# Patient Record
Sex: FEMALE | Race: WHITE | NOT HISPANIC OR LATINO | Employment: FULL TIME | URBAN - METROPOLITAN AREA
[De-identification: names, ages, dates, MRNs, and addresses within clinical notes are randomized per-mention and may not be internally consistent; named-entity substitution may affect disease eponyms.]

---

## 2017-07-26 ENCOUNTER — GENERIC CONVERSION - ENCOUNTER (OUTPATIENT)
Dept: OTHER | Facility: OTHER | Age: 22
End: 2017-07-26

## 2017-07-28 ENCOUNTER — ALLSCRIPTS OFFICE VISIT (OUTPATIENT)
Dept: OTHER | Facility: OTHER | Age: 22
End: 2017-07-28

## 2017-07-28 LAB
BILIRUB UR QL STRIP: NORMAL
CLARITY UR: NORMAL
COLOR UR: YELLOW
GLUCOSE (HISTORICAL): NORMAL
HGB UR QL STRIP.AUTO: NORMAL
KETONES UR STRIP-MCNC: NORMAL MG/DL
LEUKOCYTE ESTERASE UR QL STRIP: NORMAL
NITRITE UR QL STRIP: NORMAL
PH UR STRIP.AUTO: 7.5 [PH]
PROT UR STRIP-MCNC: NORMAL MG/DL
SP GR UR STRIP.AUTO: 1
UROBILINOGEN UR QL STRIP.AUTO: 0.2

## 2018-01-13 VITALS
WEIGHT: 105 LBS | HEART RATE: 70 BPM | DIASTOLIC BLOOD PRESSURE: 70 MMHG | TEMPERATURE: 97.5 F | SYSTOLIC BLOOD PRESSURE: 114 MMHG | BODY MASS INDEX: 17.93 KG/M2 | HEIGHT: 64 IN

## 2018-01-17 NOTE — PROGRESS NOTES
Assessment    1  Sinusitis (473 9) (J32 9)   2  Seasonal allergies (477 9) (J30 2)    Plan  Health Maintenance    · Urine Dip Non-Automated- POC; Status:Complete;   Done: [id number] 04:12PM  Sinusitis    · Amoxicillin-Pot Clavulanate 875-125 MG Oral Tablet; TAKE 1 TABLET EVERY 12  HOURS WITH MEALS UNTIL GONE    Discussion/Summary  health maintenance visit healthy adult female Currently, she eats a healthy diet and has an adequate exercise regimen  next cervical cancer screening is due now The immunizations are needed and Due for TDAP, will wait until she is feeling better  She was advised to be evaluated by a dentist  Advice and education were given regarding nutrition, weight bearing exercise, calcium supplements, vitamin D supplements, reproductive health, sunscreen use, self skin examination and seat belt use  Patient discussion: discussed with the patient  Healthy young woman here for wellness exam and to establish care  Sinusitis: Recommend treatment with course of Augmentin as prescribed  May eat yogurt daily or take OTC probiotic to minimize side effect of diarrhea  Allergic Rhinitis: Continue Flonase 2 sprays each nostril daily  OTC Claritin or Zyrtec 10 mg daily  If symptoms do not improve by Monday, or if symptoms persist call the office  Chief Complaint  Pt is c/o congestion and cough for the past few weeks  She has tried [employer] and OTC therapies without relief  History of Present Illness  , Adult Female: The patient is being seen for a health maintenance evaluation  Social History: Household members include mother  She is unmarried  Work status: working part-time and [employer] for summer, 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  The patient has never smoked cigarettes  She reports occasional alcohol use  She has never used illicit drugs  General Health: The patient's health since the last visit is described as good  She does not have regular dental visits   The patient reports her last dental visit was 2 years ago  She complains of vision problems  Vision care includes wearing soft contact lenses  She denies hearing loss  Immunizations status: not up to date   Needs TDAP  Lifestyle:  She consumes a diverse and healthy diet  She exercises regularly  She exercises 3 or more times per week  Reproductive health: the patient is perimenopausal   she reports normal menses  Screening: Cervical cancer screening includes no previous pap smear, no previous human papilloma virus screening and no previous colposcopy  Safety elements used: seat belt, safe driving habits and sunscreen  HPI: 24year old female presents today for a wellness exam and to establish care  Has cold symptoms today  She moved here 1 year ago from [place] to attend [employer]  She is not living on campus  For the past 2-3 weeks she has nasal congestion, post nasal drip, and productive cough for green sputum  Has tried Mucinex, and Flonase with no relief  Nonsmoker  She does have seasonal allergies  Review of Systems    Constitutional: feeling tired, but no fever, not feeling poorly and no chills  Eyes: no eye pain, eyes not red and no purulent discharge from the eyes  ENT: sore throat, nasal discharge and hoarseness, but no earache  Cardiovascular: the heart rate was not slow, no chest pain, the heart rate was not fast and no palpitations  Respiratory: cough, but no shortness of breath, no wheezing and no shortness of breath during exertion  Gastrointestinal: no abdominal pain, no nausea, no vomiting and no diarrhea  Musculoskeletal: no myalgias  Integumentary: no rashes and no skin lesions  Neurological: no headache, no numbness, no tingling, no dizziness, no limb weakness, no convulsions, no fainting and no difficulty walking  Psychiatric: not suicidal, no anxiety, no personality change, no sleep disturbances, no depression and no emotional problems     Endocrine: no muscle weakness and no hot flashes  no feelings of weakness   Hematologic/Lymphatic: no swollen glands, no tendency for easy bleeding, no tendency for easy bruising and no swollen glands in the neck  Active Problems    1  Seasonal allergies (477 9) (J30 2)    Past Medical History    · History of Known health problems: none (V49 89) (Z78 9)    Family History  Mother    · Family history of Healthy adult  Father    · Family history of Healthy adult    Social History    · College student   · 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   · Never a smoker   · Occasional alcohol use    Current Meds   1  Flonase Allergy Relief 50 MCG/ACT Nasal Suspension; Therapy: [id number] to Recorded    Allergies    1  No Known Drug Allergies    Vitals   Recorded: [id number] 04:08PM   Temperature 97 5 F   Heart Rate 70   Systolic 554   Diastolic 70   Height 5 ft 3 5 in   Weight 105 lb    BMI Calculated 18 31   BSA Calculated 1 48     Physical Exam    Constitutional   General appearance: No acute distress, well appearing and well nourished  Head and Face   Head and face: Normal     Palpation of the face and sinuses: No sinus tenderness  Eyes   Conjunctiva and lids: No swelling, erythema or discharge  Pupils and irises: Equal, round, reactive to light  Ears, Nose, Mouth, and Throat   External inspection of ears and nose: Normal     Otoscopic examination: Tympanic membranes translucent with normal light reflex  Canals patent without erythema  Nasal mucosa, septum, and turbinates: Abnormal   The bilateral nasal mucosa was edematous, excoriated and red  Lips, teeth, and gums: Normal, good dentition  Oropharynx: Abnormal   The posterior pharynx Mild erythema with post nasal drip  Neck   Neck: Supple, symmetric, trachea midline, no masses  Thyroid: Normal, no thyromegaly  Pulmonary   Respiratory effort: No increased work of breathing or signs of respiratory distress  Auscultation of lungs: Clear to auscultation  Cardiovascular   Auscultation of heart: Normal rate and rhythm, normal S1 and S2, no murmurs  Pedal pulses: 2+ bilaterally  Examination of extremities for edema and/or varicosities: Normal     Abdomen   Abdomen: Non-tender, no masses  Liver and spleen: No hepatomegaly or splenomegaly  Lymphatic   Palpation of lymph nodes in neck: No lymphadenopathy  Musculoskeletal   Gait and station: Normal     Digits and nails: Normal without clubbing or cyanosis  Joints, bones, and muscles: Normal     Range of motion: Normal     Stability: Normal     Muscle strength/tone: Normal     Skin   Skin and subcutaneous tissue: Normal without rashes or lesions  Neurologic   Cranial nerves: Cranial nerves II-XII intact  Psychiatric   Judgment and insight: Normal     Orientation to person, place, and time: Normal     Recent and remote memory: Intact  Mood and affect: Normal        Results/Data  Urine Dip Non-Automated- POC [id number] 04:12PM Carline Edwards     Test Name Result Flag Reference   Color Yellow     Clarity Transparent     Leukocytes -     Nitrite -     Blood -     Bilirubin -     Urobilinogen 0 2     Protein -     Ph 7 5     Specific Gravity 1 005     Ketone -     Glucose -         Signatures   Electronically signed by : PHILL Campos; Jul 29 2017  6:40PM EST                       (Author)    Electronically signed by :  ALEXIS Vaughn ; Jul 29 2017  8:17PM EST                       (Author)

## 2020-01-27 ENCOUNTER — OFFICE VISIT (OUTPATIENT)
Dept: OBGYN CLINIC | Facility: CLINIC | Age: 25
End: 2020-01-27
Payer: COMMERCIAL

## 2020-01-27 VITALS — WEIGHT: 107 LBS | BODY MASS INDEX: 18.66 KG/M2 | SYSTOLIC BLOOD PRESSURE: 104 MMHG | DIASTOLIC BLOOD PRESSURE: 68 MMHG

## 2020-01-27 DIAGNOSIS — N63.24 BREAST LUMP ON LEFT SIDE AT 7 O'CLOCK POSITION: Primary | ICD-10-CM

## 2020-01-27 PROCEDURE — 99202 OFFICE O/P NEW SF 15 MIN: CPT | Performed by: NURSE PRACTITIONER

## 2020-01-27 NOTE — ASSESSMENT & PLAN NOTE
There is a 1cm x 1cm nontender, soft, mobile lump of the left breast palpable at 7 o'clock  The lump seems to be resolving on its own and is cyclical in nature  Provided patient with a slip for left breast ultrasound  Recommend that patient continue to monitor lump over the next week  If lump does not resolve, recommend patient call and schedule left breast ultrasound for further evaluation  Patient expresses understanding and agrees to plan

## 2020-01-27 NOTE — PROGRESS NOTES
Assessment/Plan:    Breast lump on left side at 7 o'clock position  There is a 1cm x 1cm nontender, soft, mobile lump of the left breast palpable at 7 o'clock  The lump seems to be resolving on its own and is cyclical in nature  Provided patient with a slip for left breast ultrasound  Recommend that patient continue to monitor lump over the next week  If lump does not resolve, recommend patient call and schedule left breast ultrasound for further evaluation  Patient expresses understanding and agrees to plan  Diagnoses and all orders for this visit:    Breast lump on left side at 7 o'clock position  -     US breast left limited (diagnostic); Future          Subjective:      Patient ID: Brennon Vann is a 25 y o  female  Roxie Noyola is a new patient here for a problem visit  Roxie Noyola c/o a left breast lump that she noticed last Thursday, 4 days ago  She felt the lump before she was getting in the shower and noticed a bruise there as well  Denies trauma to her breast, fever, chills, nipple discharge, pain  She started her menses on Thursday as well  Since Thursday, the lump has gotten smaller and it's harder for her to palpate now  She had a breast lump previously in high school and this resolved on its own  Her mom has a hx of fibrocystic breasts  The following portions of the patient's history were reviewed and updated as appropriate: allergies, current medications, past family history, past medical history, past social history, past surgical history and problem list     Review of Systems   Constitutional: Negative  HENT: Negative  Eyes: Negative  Respiratory: Negative  Cardiovascular: Negative  Gastrointestinal: Negative  Endocrine: Negative  Genitourinary: Negative  Left breast lump   Musculoskeletal: Negative  Skin: Negative  Allergic/Immunologic: Negative  Neurological: Negative  Hematological: Negative  Psychiatric/Behavioral: Negative  Objective:      /68   Wt 48 5 kg (107 lb)   LMP 01/23/2020   BMI 18 66 kg/m²          Physical Exam   Constitutional: She is oriented to person, place, and time  She appears well-developed and well-nourished  No distress  Cardiovascular: Normal rate, regular rhythm and normal heart sounds  Pulmonary/Chest: Effort normal and breath sounds normal  Right breast exhibits no inverted nipple, no mass, no nipple discharge, no skin change and no tenderness  Left breast exhibits mass  Left breast exhibits no inverted nipple, no nipple discharge, no skin change and no tenderness  No breast swelling, tenderness, discharge or bleeding  Breasts are symmetrical    There is a 1cm x 1cm soft, mobile, non tender lump of the left breast at 7 o 'clock  Musculoskeletal: Normal range of motion  Neurological: She is alert and oriented to person, place, and time  Skin: Skin is warm and dry  Psychiatric: She has a normal mood and affect  Her behavior is normal  Judgment and thought content normal    Vitals reviewed

## 2020-04-07 ENCOUNTER — TELEPHONE (OUTPATIENT)
Dept: FAMILY MEDICINE CLINIC | Facility: CLINIC | Age: 25
End: 2020-04-07

## 2020-07-31 ENCOUNTER — OFFICE VISIT (OUTPATIENT)
Dept: FAMILY MEDICINE CLINIC | Facility: CLINIC | Age: 25
End: 2020-07-31
Payer: COMMERCIAL

## 2020-07-31 VITALS
TEMPERATURE: 98.4 F | HEART RATE: 62 BPM | BODY MASS INDEX: 17.86 KG/M2 | SYSTOLIC BLOOD PRESSURE: 118 MMHG | HEIGHT: 64 IN | DIASTOLIC BLOOD PRESSURE: 70 MMHG | WEIGHT: 104.6 LBS | RESPIRATION RATE: 16 BRPM | OXYGEN SATURATION: 98 %

## 2020-07-31 DIAGNOSIS — Z13.220 LIPID SCREENING: ICD-10-CM

## 2020-07-31 DIAGNOSIS — R31.9 URINARY TRACT INFECTION WITH HEMATURIA, SITE UNSPECIFIED: Primary | ICD-10-CM

## 2020-07-31 DIAGNOSIS — R63.4 WEIGHT LOSS: ICD-10-CM

## 2020-07-31 DIAGNOSIS — Z76.89 ENCOUNTER TO ESTABLISH CARE: ICD-10-CM

## 2020-07-31 DIAGNOSIS — Z12.4 CERVICAL CANCER SCREENING: ICD-10-CM

## 2020-07-31 DIAGNOSIS — Z00.00 PHYSICAL EXAM: ICD-10-CM

## 2020-07-31 DIAGNOSIS — N39.0 URINARY TRACT INFECTION WITH HEMATURIA, SITE UNSPECIFIED: Primary | ICD-10-CM

## 2020-07-31 LAB
AMORPH PHOS CRY URNS QL MICRO: ABNORMAL /HPF
BACTERIA UR QL AUTO: ABNORMAL /HPF
BILIRUB UR QL STRIP: NEGATIVE
CLARITY UR: ABNORMAL
COLOR UR: YELLOW
GLUCOSE UR STRIP-MCNC: NEGATIVE MG/DL
HGB UR QL STRIP.AUTO: ABNORMAL
KETONES UR STRIP-MCNC: NEGATIVE MG/DL
LEUKOCYTE ESTERASE UR QL STRIP: NEGATIVE
MUCOUS THREADS UR QL AUTO: ABNORMAL
NITRITE UR QL STRIP: NEGATIVE
NON-SQ EPI CELLS URNS QL MICRO: ABNORMAL /HPF
PH UR STRIP.AUTO: 7.5 [PH]
PROT UR STRIP-MCNC: NEGATIVE MG/DL
RBC #/AREA URNS AUTO: ABNORMAL /HPF
SL AMB  POCT GLUCOSE, UA: NORMAL
SL AMB LEUKOCYTE ESTERASE,UA: NORMAL
SL AMB POCT BILIRUBIN,UA: NORMAL
SL AMB POCT BLOOD,UA: NORMAL
SL AMB POCT CLARITY,UA: CLEAR
SL AMB POCT COLOR,UA: YELLOW
SL AMB POCT KETONES,UA: NORMAL
SL AMB POCT NITRITE,UA: NORMAL
SL AMB POCT PH,UA: 8
SL AMB POCT SPECIFIC GRAVITY,UA: 1.02
SL AMB POCT URINE PROTEIN: NORMAL
SL AMB POCT UROBILINOGEN: 0.2
SP GR UR STRIP.AUTO: 1.02 (ref 1–1.03)
UROBILINOGEN UR QL STRIP.AUTO: 0.2 E.U./DL
WBC #/AREA URNS AUTO: ABNORMAL /HPF

## 2020-07-31 PROCEDURE — 81001 URINALYSIS AUTO W/SCOPE: CPT | Performed by: NURSE PRACTITIONER

## 2020-07-31 PROCEDURE — 99385 PREV VISIT NEW AGE 18-39: CPT | Performed by: NURSE PRACTITIONER

## 2020-07-31 PROCEDURE — 81003 URINALYSIS AUTO W/O SCOPE: CPT | Performed by: NURSE PRACTITIONER

## 2020-07-31 RX ORDER — MOMETASONE FUROATE 1 MG/ML
SOLUTION TOPICAL
COMMUNITY
Start: 2020-06-29 | End: 2021-02-24 | Stop reason: ALTCHOICE

## 2020-07-31 RX ORDER — CICLOPIROX 1 G/100ML
SHAMPOO TOPICAL
COMMUNITY
Start: 2020-06-30 | End: 2021-02-24 | Stop reason: ALTCHOICE

## 2020-07-31 NOTE — PROGRESS NOTES
FAMILY PRACTICE OFFICE VISIT       NAME: Hill Hudson  AGE: 25 y o  SEX: female       : 1995        MRN: [id number]    Assessment and Plan     Problem List Items Addressed This Visit     None      Visit Diagnoses     Physical exam    -  Primary    Encounter to establish care        Cervical cancer screening        Relevant Orders    Ambulatory referral to Obstetrics / Gynecology    Lipid screening        Relevant Orders    Lipid Panel with Direct LDL reflex    Weight loss        Relevant Orders    CBC    Comprehensive metabolic panel    TSH, 3rd generation          1  Physical exam     2  Encounter to establish care     3  Cervical cancer screening  Ambulatory referral to Obstetrics / Gynecology   4  Lipid screening  Lipid Panel with Direct LDL reflex   5  Weight loss  CBC    Comprehensive metabolic panel    TSH, 3rd generation     This 25year old female presents today for annual physical exam and to establish care  Healthy appearing adult female  Prior PCP in [place]  She will request records  History of seasonal allergies, seborrhea, and asthma in childhood  No past surgical history  Never had a pap smear  Recommend referral to gyn, name and number provided  Currently not sexually active  Exercises regularly  Eats whatever she wants  Has always had difficulty gaining weight  Declines HPV vaccine series  Is not sure when last Tdap was, will await records  Will check routine blood work  Advised to get a dental exam every 6 months  Follow up in 1 year for annual exam or sooner if needed  Chief Complaint     Chief Complaint   Patient presents with    Annual Exam     NP is here for yearly exam       History of Present Illness     Hill Hudson is a 25year old female presenting today for annual physical exam and to establish care  She was a patient at our office in the past, but has not been here for several years   She had been living in [place], recently moved back to [place]  Reports history of asthma in early childhood  Has seasonal allergies and seborrhea  She will request records from [place] be sent to our office  Has never had a pap smear  Denies being sexually active  Exercises through weight lifting  No cardio  Has always been very thin  Weight has never been more than 108 pounds  Has difficulty gaining weight  Family members are all lean  Eats whatever she wants  She is currently working as a   Is not sure when her last tdap was  She does not believe she has had HPV vaccines, and does not want HPV vaccines  Has regular eye exams wears contacts  Last dental exam about 2 years ago, aware she needs to schedule  Review of Systems   Review of Systems   Constitutional: Negative  HENT: Negative  Eyes: Negative  Respiratory: Negative  Cardiovascular: Negative  Gastrointestinal: Negative  Endocrine: Negative  Genitourinary: Negative  Musculoskeletal: Negative  Skin: Negative  Allergic/Immunologic: Negative  Neurological: Negative  Hematological: Negative  Psychiatric/Behavioral: Negative          Active Problem List     Patient Active Problem List   Diagnosis    Breast lump on left side at 7 o'clock position       Past Medical History:  Past Medical History:   Diagnosis Date    Migraine        Past Surgical History:  Past Surgical History:   Procedure Laterality Date    NO PAST SURGERIES         Family History:  Family History   Problem Relation Age of Onset   Jenna Clunes Migraines Mother     No Known Problems Father        Social History:  Social History     Socioeconomic History    Marital status: Single     Spouse name: Not on file    Number of children: Not on file    Years of education: Not on file    Highest education level: Not on file   Occupational History    Not on file   Social Needs    Financial resource strain: Not on file    Food insecurity: Worry: Not on file     Inability: Not on file    Transportation needs:     Medical: Not on file     Non-medical: Not on file   Tobacco Use    Smoking status: Never Smoker    Smokeless tobacco: Never Used   Substance and Sexual Activity    Alcohol use: Yes     Comment: social    Drug use: Never    Sexual activity: Not Currently     Birth control/protection: None   Lifestyle    Physical activity:     Days per week: Not on file     Minutes per session: Not on file    Stress: Not on file   Relationships    Social connections:     Talks on phone: Not on file     Gets together: Not on file     Attends Jainism service: Not on file     Active member of club or organization: Not on file     Attends meetings of clubs or organizations: Not on file     Relationship status: Not on file    Intimate partner violence:     Fear of current or ex partner: Not on file     Emotionally abused: Not on file     Physically abused: Not on file     Forced sexual activity: Not on file   Other Topics Concern    Not on file   Social History Narrative    Not on file       I have reviewed the patient's medical history in detail; there are no changes to the history as noted in the electronic medical record  Objective     Vitals:    07/31/20 1401   BP: 118/70   Pulse: 62   Resp: 16   Temp: 98 4 °F (36 9 °C)   TempSrc: Temporal   SpO2: 98%   Weight: 47 4 kg (104 lb 9 6 oz)   Height: 5' 3 5" (1 613 m)     Wt Readings from Last 3 Encounters:   07/31/20 47 4 kg (104 lb 9 6 oz)   01/27/20 48 5 kg (107 lb)   07/28/17 47 6 kg (105 lb)     Physical Exam   Constitutional: She is oriented to person, place, and time  She appears well-developed and well-nourished  No distress  HENT:   Head: Normocephalic and atraumatic  Right Ear: Tympanic membrane and ear canal normal    Left Ear: Tympanic membrane and ear canal normal    Eyes: Pupils are equal, round, and reactive to light  Conjunctivae and EOM are normal    Neck: Normal range of motion  Neck supple  No thyromegaly present  Cardiovascular: Normal rate and regular rhythm  No murmur heard  Pulmonary/Chest: Effort normal and breath sounds normal  No respiratory distress  She has no wheezes  She has no rales  Abdominal: Soft  Bowel sounds are normal  She exhibits no mass  There is no tenderness  There is no guarding  Musculoskeletal: She exhibits no edema or deformity  Lymphadenopathy:     She has no cervical adenopathy  Neurological: She is alert and oriented to person, place, and time  Skin: No rash noted  Psychiatric: She has a normal mood and affect  Nursing note and vitals reviewed  BMI Counseling: Body mass index is 18 24 kg/m²  The BMI is below normal  Patient advised to gain weight  ALLERGIES:  Allergies   Allergen Reactions    Seasonal Ic [Cholestatin]        Current Medications     Current Outpatient Medications   Medication Sig Dispense Refill    Ciclopirox 1 % shampoo SHAMPOO 2 TO 3 TIMES A WEEK      mometasone (ELOCON) 0 1 % lotion APPLY TO SCALP TWO TIMES A DAY AS NEEDED FOR ITCHING FOR 2 WEEKS MAX       No current facility-administered medications for this visit            Health Maintenance     Health Maintenance   Topic Date Due    HPV Vaccine (1 - Female 2-dose series) 09/09/2006    HIV Screening  09/09/2010    Chlamydia Screening  09/09/2011    BMI: Followup Plan  09/09/2013    Annual Physical  09/09/2013    Cervical Cancer Screening  09/09/2016    DTaP,Tdap,and Td Vaccines (7 - Td) 10/13/2016    Influenza Vaccine  07/01/2020    Depression Screening PHQ  07/31/2021    BMI: Adult  07/31/2021    Pneumococcal Vaccine: 65+ Years (1 of 2 - PCV13) 09/09/2060    HIB Vaccine  Completed    Hepatitis B Vaccine  Completed    IPV Vaccine  Completed    Pneumococcal Vaccine: Pediatrics (0 to 5 Years) and At-Risk Patients (6 to 59 Years)  Aged Out    Hepatitis A Vaccine  Aged Out    Meningococcal ACWY Vaccine  Aged [employer] History Administered Date(s) Administered    DTaP 5 1995, 01/11/1996, 03/13/1996, 03/11/1997, 10/13/1999    Hep B, adult 1995, 1995, 03/13/1996    Hib (PRP-OMP) 1995, 01/11/1996, 02/03/1997, 03/11/1997    IPV 1995, 01/11/1996, 03/11/1997, 10/13/1997    MMR 02/03/1997, 02/28/2001    Meningococcal, Unknown Serogroups 11/06/2008, 07/05/2012    Tdap 10/13/2006    Varicella 11/14/1996, 11/06/2008       PHILL Garcia

## 2020-08-01 ENCOUNTER — TELEPHONE (OUTPATIENT)
Dept: OTHER | Facility: OTHER | Age: 25
End: 2020-08-01

## 2020-08-03 ENCOUNTER — TELEPHONE (OUTPATIENT)
Dept: FAMILY MEDICINE CLINIC | Facility: CLINIC | Age: 25
End: 2020-08-03

## 2020-08-03 DIAGNOSIS — R31.29 MICROSCOPIC HEMATURIA: Primary | ICD-10-CM

## 2020-08-03 NOTE — TELEPHONE ENCOUNTER
----- Message from [id number] sent at 8/3/2020  3:25 PM EDT -----  Please let patient know there is a small amount of microscopic blood in her urine  Please repeat urinalysis at the lab in 1-2 weeks to be sure this clears  Please be sure to complete this when you do not have your period  I placed orders

## 2020-08-03 NOTE — TELEPHONE ENCOUNTER
I recommend OTC allergy medication, such as Claritin, Zyrtec, Allegra, or Xyzal      Another alternative would be an OTC nasal spray such as flonase or nasacort  She may use both an antihistamine tablet (claritin, zyrtec, Allegra, or Xyzal) and nasal spray if only one is not effective

## 2020-08-05 ENCOUNTER — TELEPHONE (OUTPATIENT)
Dept: FAMILY MEDICINE CLINIC | Facility: CLINIC | Age: 25
End: 2020-08-05

## 2020-09-25 ENCOUNTER — APPOINTMENT (OUTPATIENT)
Dept: LAB | Facility: CLINIC | Age: 25
End: 2020-09-25
Payer: COMMERCIAL

## 2020-09-25 DIAGNOSIS — Z13.220 LIPID SCREENING: ICD-10-CM

## 2020-09-25 DIAGNOSIS — R63.4 WEIGHT LOSS: ICD-10-CM

## 2020-09-25 LAB
ALBUMIN SERPL BCP-MCNC: 4.4 G/DL (ref 3.5–5)
ALP SERPL-CCNC: 75 U/L (ref 46–116)
ALT SERPL W P-5'-P-CCNC: 15 U/L (ref 12–78)
ANION GAP SERPL CALCULATED.3IONS-SCNC: 6 MMOL/L (ref 4–13)
AST SERPL W P-5'-P-CCNC: 11 U/L (ref 5–45)
BILIRUB SERPL-MCNC: 0.92 MG/DL (ref 0.2–1)
BUN SERPL-MCNC: 13 MG/DL (ref 5–25)
CALCIUM SERPL-MCNC: 9.6 MG/DL (ref 8.3–10.1)
CHLORIDE SERPL-SCNC: 106 MMOL/L (ref 100–108)
CHOLEST SERPL-MCNC: 165 MG/DL (ref 50–200)
CO2 SERPL-SCNC: 28 MMOL/L (ref 21–32)
CREAT SERPL-MCNC: 0.82 MG/DL (ref 0.6–1.3)
ERYTHROCYTE [DISTWIDTH] IN BLOOD BY AUTOMATED COUNT: 11.5 % (ref 11.6–15.1)
GFR SERPL CREATININE-BSD FRML MDRD: 100 ML/MIN/1.73SQ M
GLUCOSE P FAST SERPL-MCNC: 80 MG/DL (ref 65–99)
HCT VFR BLD AUTO: 42.4 % (ref 34.8–46.1)
HDLC SERPL-MCNC: 66 MG/DL
HGB BLD-MCNC: 13.6 G/DL (ref 11.5–15.4)
LDLC SERPL CALC-MCNC: 85 MG/DL (ref 0–100)
MCH RBC QN AUTO: 29.7 PG (ref 26.8–34.3)
MCHC RBC AUTO-ENTMCNC: 32.1 G/DL (ref 31.4–37.4)
MCV RBC AUTO: 93 FL (ref 82–98)
PLATELET # BLD AUTO: 228 THOUSANDS/UL (ref 149–390)
PMV BLD AUTO: 10.6 FL (ref 8.9–12.7)
POTASSIUM SERPL-SCNC: 3.8 MMOL/L (ref 3.5–5.3)
PROT SERPL-MCNC: 7.7 G/DL (ref 6.4–8.2)
RBC # BLD AUTO: 4.58 MILLION/UL (ref 3.81–5.12)
SODIUM SERPL-SCNC: 140 MMOL/L (ref 136–145)
TRIGL SERPL-MCNC: 72 MG/DL
TSH SERPL DL<=0.05 MIU/L-ACNC: 3.5 UIU/ML (ref 0.36–3.74)
WBC # BLD AUTO: 7.74 THOUSAND/UL (ref 4.31–10.16)

## 2020-09-25 PROCEDURE — 85027 COMPLETE CBC AUTOMATED: CPT

## 2020-09-25 PROCEDURE — 80061 LIPID PANEL: CPT

## 2020-09-25 PROCEDURE — 36415 COLL VENOUS BLD VENIPUNCTURE: CPT

## 2020-09-25 PROCEDURE — 80053 COMPREHEN METABOLIC PANEL: CPT

## 2020-09-25 PROCEDURE — 84443 ASSAY THYROID STIM HORMONE: CPT

## 2020-09-29 ENCOUNTER — TELEPHONE (OUTPATIENT)
Dept: FAMILY MEDICINE CLINIC | Facility: CLINIC | Age: 25
End: 2020-09-29

## 2020-09-29 NOTE — TELEPHONE ENCOUNTER
----- Message from [id number] [facility] sent at 9/28/2020  9:21 PM EDT -----  Please let patient all of her blood work is good

## 2021-02-19 ENCOUNTER — TRANSCRIBE ORDERS (OUTPATIENT)
Dept: ADMINISTRATIVE | Facility: HOSPITAL | Age: 26
End: 2021-02-19

## 2021-02-19 DIAGNOSIS — J34.3 HYPERTROPHY OF NASAL TURBINATES: ICD-10-CM

## 2021-02-19 DIAGNOSIS — M95.0 ACQUIRED DEFORMITY OF NOSE: ICD-10-CM

## 2021-02-19 DIAGNOSIS — J34.2 DEVIATED NASAL SEPTUM: ICD-10-CM

## 2021-02-19 DIAGNOSIS — J32.9 CHRONIC SINUSITIS, UNSPECIFIED: Primary | ICD-10-CM

## 2021-02-24 ENCOUNTER — OFFICE VISIT (OUTPATIENT)
Dept: FAMILY MEDICINE CLINIC | Facility: CLINIC | Age: 26
End: 2021-02-24
Payer: COMMERCIAL

## 2021-02-24 ENCOUNTER — HOSPITAL ENCOUNTER (OUTPATIENT)
Dept: CT IMAGING | Facility: HOSPITAL | Age: 26
Discharge: HOME | End: 2021-02-24
Payer: COMMERCIAL

## 2021-02-24 VITALS
TEMPERATURE: 98 F | RESPIRATION RATE: 16 BRPM | SYSTOLIC BLOOD PRESSURE: 110 MMHG | WEIGHT: 103 LBS | OXYGEN SATURATION: 98 % | BODY MASS INDEX: 17.58 KG/M2 | DIASTOLIC BLOOD PRESSURE: 70 MMHG | HEIGHT: 64 IN | HEART RATE: 74 BPM

## 2021-02-24 DIAGNOSIS — Z01.818 PRE-OPERATIVE EXAM: Primary | ICD-10-CM

## 2021-02-24 DIAGNOSIS — M95.0 ACQUIRED DEFORMITY OF NOSE: ICD-10-CM

## 2021-02-24 DIAGNOSIS — J34.2 DEVIATED NASAL SEPTUM: ICD-10-CM

## 2021-02-24 DIAGNOSIS — J34.3 HYPERTROPHY OF NASAL TURBINATES: ICD-10-CM

## 2021-02-24 DIAGNOSIS — J32.9 CHRONIC SINUSITIS, UNSPECIFIED: ICD-10-CM

## 2021-02-24 DIAGNOSIS — J34.2 NOSE SEPTUM DEVIATION: ICD-10-CM

## 2021-02-24 LAB
SL AMB  POCT GLUCOSE, UA: NORMAL
SL AMB LEUKOCYTE ESTERASE,UA: NORMAL
SL AMB POCT BILIRUBIN,UA: NORMAL
SL AMB POCT BLOOD,UA: NORMAL
SL AMB POCT CLARITY,UA: CLEAR
SL AMB POCT COLOR,UA: NORMAL
SL AMB POCT KETONES,UA: NORMAL
SL AMB POCT NITRITE,UA: NORMAL
SL AMB POCT PH,UA: 7.5
SL AMB POCT SPECIFIC GRAVITY,UA: 1
SL AMB POCT URINE PROTEIN: NORMAL
SL AMB POCT UROBILINOGEN: 0.2

## 2021-02-24 PROCEDURE — 81003 URINALYSIS AUTO W/O SCOPE: CPT | Performed by: NURSE PRACTITIONER

## 2021-02-24 PROCEDURE — G1004 CDSM NDSC: HCPCS

## 2021-02-24 PROCEDURE — 93000 ELECTROCARDIOGRAM COMPLETE: CPT | Performed by: NURSE PRACTITIONER

## 2021-02-24 PROCEDURE — 70486 CT MAXILLOFACIAL W/O DYE: CPT

## 2021-02-24 PROCEDURE — 99214 OFFICE O/P EST MOD 30 MIN: CPT | Performed by: NURSE PRACTITIONER

## 2021-02-24 RX ORDER — AZITHROMYCIN 250 MG/1
TABLET, FILM COATED ORAL
COMMUNITY
Start: 2021-02-17 | End: 2021-02-24 | Stop reason: ALTCHOICE

## 2021-02-24 RX ORDER — CHLORHEXIDINE GLUCONATE 213 G/1000ML
SOLUTION TOPICAL
COMMUNITY
Start: 2021-02-18

## 2021-02-24 RX ORDER — METHYLPREDNISOLONE 4 MG/1
TABLET ORAL
COMMUNITY
Start: 2021-02-17 | End: 2021-02-24 | Stop reason: ALTCHOICE

## 2021-02-24 NOTE — PROGRESS NOTES
FAMILY PRACTICE OFFICE VISIT       NAME: Eleanor Martinez  AGE: 22 y o  SEX: female       : 1995        MRN: [id number]      Assessment and Plan     Problem List Items Addressed This Visit     None      Visit Diagnoses     Pre-operative exam    -  Primary    Relevant Orders    CBC and differential (Completed)    Comprehensive metabolic panel (Completed)    Protime-INR (Completed)    APTT (Completed)    hCG, quantitative (Completed)    POCT urine dip auto non-scope (Completed)    POCT ECG (Completed)    Nose septum deviation              1  Pre-operative exam  CBC and differential    Comprehensive metabolic panel    Protime-INR    APTT    hCG, quantitative    POCT urine dip auto non-scope    POCT ECG   2  Nose septum deviation       This 72-year-old female presents today for preoperative examination  She is scheduled for rhinoplasty on  with Dr Juan Villela of [address]  Past medical history is significant for migraines  Past surgical history is significant for rhinoplasty  Physical exam is unremarkable today  In office urine dip is clear  In office ECG shows normal sinus rhythm, sinus bradycardia with heart rate of 56  No ischemic changes  No comparison available  We completed screenings in office today recommended by surgery team, including Y-BOCS, HDRS, HAM-A   Y-BOCS=0, HDRS=2, HAM-A=0  She will complete blood work as noted  She will complete COVID-19 swab and COVID-19 antibodies through surgical office,  6 days prior to procedure  As directed  21: received results of blood work  HCG: negative  PT/INR, APTT, CMP, CBC with diff unremarkable  Acceptable risk to proceed with surgery as scheduled  Chief Complaint     Chief Complaint   Patient presents with    Pre-op Exam       History of Present Illness     Eleanor Martinez is a 72-year-old female presenting today for preoperative clearance  She is scheduled for rhinoplasty on  with Dr Juan Villela, [address]     She has history of having rhinoplasty in the past, for appearance  States goal of this surgery is for improvement in breathing  Notes she has difficulty breathing through her nose  Has chronic sinus congestion  Enjoys singing, and above symptoms sometimes interfere with singing  Completed CT sinuses this morning  Currently is working from home due to COVID-19 pandemic  Denies any personal or family history of problems with anesthesia  Denies any personal or family history of problems with bleeding or clotting  Denies any exertional shortness of breath or chest pain  LMP one week ago  No problems with menses  Denies any past or present problems with anxiety or depression  Review of Systems   Review of Systems   Constitutional: Negative  HENT:        Nose as noted in HPI   Eyes: Negative  Respiratory: Negative  Cardiovascular: Negative  Gastrointestinal: Negative  Endocrine: Negative  Genitourinary: Negative  Musculoskeletal: Negative  Skin: Negative  Allergic/Immunologic: Negative  Neurological: Negative  Hematological: Negative  Psychiatric/Behavioral: Negative          Active Problem List     Patient Active Problem List   Diagnosis    Seasonal allergies       Past Medical History:  Past Medical History:   Diagnosis Date    Breast lump on left side at 7 o'clock position 1/27/2020    Migraine        Past Surgical History:  Past Surgical History:   Procedure Laterality Date    RHINOPLASTY         Family History:  Family History   Problem Relation Age of Onset   [address] Migraines Mother     No Known Problems Father        Social History:  Social History     Socioeconomic History    Marital status: Single     Spouse name: Not on file    Number of children: Not on file    Years of education: Not on file    Highest education level: Not on file   Occupational History    Not on file   Social Needs    Financial resource strain: Not on file   [place] insecurity     Worry: Not on file     Inability: Not on file    Transportation needs     Medical: Not on file     Non-medical: Not on file   Tobacco Use    Smoking status: Never Smoker    Smokeless tobacco: Never Used   Substance and Sexual Activity    Alcohol use: Yes     Comment: social    Drug use: Never    Sexual activity: Not Currently     Birth control/protection: None   Lifestyle    Physical activity     Days per week: Not on file     Minutes per session: Not on file    Stress: Not on file   Relationships    Social connections     Talks on phone: Not on file     Gets together: Not on file     Attends Voodoo service: Not on file     Active member of club or organization: Not on file     Attends meetings of clubs or organizations: Not on file     Relationship status: Not on file    Intimate partner violence     Fear of current or ex partner: Not on file     Emotionally abused: Not on file     Physically abused: Not on file     Forced sexual activity: Not on file   Other Topics Concern    Not on file   Social History Narrative    Not on file       I have reviewed the patient's medical history in detail; there are no changes to the history as noted in the electronic medical record  Objective     Vitals:    02/24/21 0859   BP: 110/70   Pulse: 74   Resp: 16   Temp: 98 °F (36 7 °C)   TempSrc: Temporal   SpO2: 98%   Weight: 46 7 kg (103 lb)   Height: 5' 3 5" (1 613 m)     Wt Readings from Last 3 Encounters:   02/24/21 46 7 kg (103 lb)   07/31/20 47 4 kg (104 lb 9 6 oz)   01/27/20 48 5 kg (107 lb)     Physical Exam  Vitals signs and nursing note reviewed  Constitutional:       General: She is not in acute distress  Appearance: Normal appearance  She is well-developed  She is not ill-appearing, toxic-appearing or diaphoretic  HENT:      Head: Normocephalic and atraumatic  Ears:      Comments:   Bilateral tympanic membranes partially occluded by cerumen    Portions of tympanic membranes able to be visualized up appear normal      Nose: Septal deviation (right) present  Right Turbinates: Swollen (mildlymildly)  Left Turbinates: Swollen  Mouth/Throat:      Mouth: Mucous membranes are moist       Pharynx: Oropharynx is clear  Eyes:      General: No scleral icterus  Right eye: No discharge  Left eye: No discharge  Extraocular Movements: Extraocular movements intact  Conjunctiva/sclera: Conjunctivae normal       Pupils: Pupils are equal, round, and reactive to light  Neck:      Musculoskeletal: Normal range of motion and neck supple  Thyroid: No thyromegaly  Cardiovascular:      Rate and Rhythm: Normal rate and regular rhythm  Heart sounds: No murmur  Pulmonary:      Effort: Pulmonary effort is normal  No respiratory distress  Breath sounds: Normal breath sounds  No wheezing or rales  Abdominal:      General: Bowel sounds are normal       Palpations: Abdomen is soft  Tenderness: There is no abdominal tenderness  Musculoskeletal:         General: No deformity  Right lower leg: No edema  Left lower leg: No edema  Lymphadenopathy:      Cervical: No cervical adenopathy  Skin:     General: Skin is warm and dry  Capillary Refill: Capillary refill takes less than 2 seconds  Findings: No rash  Neurological:      Mental Status: She is alert and oriented to person, place, and time     Psychiatric:         Mood and Affect: Mood normal          Behavior: Behavior normal             ALLERGIES:  Allergies   Allergen Reactions    Seasonal Ic [Cholestatin] Nasal Congestion       Current Medications     Current Outpatient Medications   Medication Sig Dispense Refill    Hibiclens 4 % external liquid 1 APPLICATION AS DIRECTED; USE WASH NIGHT PRIOR TO SURGERY      mupirocin (BACTROBAN) 2 % ointment 1 APPLICATION IN THE NOSTRILS AS DIRECTED; APPLY TO NOSE NIGHT PRIOR TO SURGERY       No current facility-administered medications for this visit            Health Maintenance     Health Maintenance   Topic Date Due    HPV Vaccine (1 - 2-dose series) 09/09/2006    HIV Screening  09/09/2010    Cervical Cancer Screening  09/09/2016    DTaP,Tdap,and Td Vaccines (7 - Td) 10/13/2016    Influenza Vaccine (1) 09/01/2020    BMI: Followup Plan  07/31/2021    Annual Physical  07/31/2021    Depression Screening PHQ  02/24/2022    BMI: Adult  02/24/2022    HIB Vaccine  Completed    Hepatitis B Vaccine  Completed    IPV Vaccine  Completed    Pneumococcal Vaccine: Pediatrics (0 to 5 Years) and At-Risk Patients (6 to 59 Years)  Aged Out    Hepatitis A Vaccine  Aged Out    Meningococcal ACWY Vaccine  Aged [employer] History   Administered Date(s) Administered    DTaP 5 1995, 01/11/1996, 03/13/1996, 03/11/1997, 10/13/1999    Hep B, adult 1995, 1995, 03/13/1996    Hib (PRP-OMP) 1995, 01/11/1996, 02/03/1997, 03/11/1997    IPV 1995, 01/11/1996, 03/11/1997, 10/13/1997    MMR 02/03/1997, 02/28/2001    Meningococcal, Unknown Serogroups 11/06/2008, 07/05/2012    Tdap 10/13/2006    Varicella 11/14/1996, 11/06/2008       PHILL Donohue

## 2021-02-26 ENCOUNTER — LAB (OUTPATIENT)
Dept: LAB | Facility: CLINIC | Age: 26
End: 2021-02-26
Payer: COMMERCIAL

## 2021-02-26 DIAGNOSIS — Z01.818 PRE-OPERATIVE EXAM: ICD-10-CM

## 2021-02-26 LAB
ALBUMIN SERPL BCP-MCNC: 4.5 G/DL (ref 3.5–5)
ALP SERPL-CCNC: 79 U/L (ref 46–116)
ALT SERPL W P-5'-P-CCNC: 19 U/L (ref 12–78)
ANION GAP SERPL CALCULATED.3IONS-SCNC: 2 MMOL/L (ref 4–13)
APTT PPP: 26 SECONDS (ref 23–37)
AST SERPL W P-5'-P-CCNC: 12 U/L (ref 5–45)
B-HCG SERPL-ACNC: <2 MIU/ML
BASOPHILS # BLD AUTO: 0.04 THOUSANDS/ΜL (ref 0–0.1)
BASOPHILS NFR BLD AUTO: 1 % (ref 0–1)
BILIRUB SERPL-MCNC: 0.56 MG/DL (ref 0.2–1)
BUN SERPL-MCNC: 14 MG/DL (ref 5–25)
CALCIUM SERPL-MCNC: 10.1 MG/DL (ref 8.3–10.1)
CHLORIDE SERPL-SCNC: 109 MMOL/L (ref 100–108)
CO2 SERPL-SCNC: 30 MMOL/L (ref 21–32)
CREAT SERPL-MCNC: 0.88 MG/DL (ref 0.6–1.3)
EOSINOPHIL # BLD AUTO: 0.2 THOUSAND/ΜL (ref 0–0.61)
EOSINOPHIL NFR BLD AUTO: 3 % (ref 0–6)
ERYTHROCYTE [DISTWIDTH] IN BLOOD BY AUTOMATED COUNT: 11.7 % (ref 11.6–15.1)
GFR SERPL CREATININE-BSD FRML MDRD: 92 ML/MIN/1.73SQ M
GLUCOSE P FAST SERPL-MCNC: 75 MG/DL (ref 65–99)
HCT VFR BLD AUTO: 43.4 % (ref 34.8–46.1)
HGB BLD-MCNC: 14.1 G/DL (ref 11.5–15.4)
IMM GRANULOCYTES # BLD AUTO: 0.03 THOUSAND/UL (ref 0–0.2)
IMM GRANULOCYTES NFR BLD AUTO: 0 % (ref 0–2)
INR PPP: 0.99 (ref 0.84–1.19)
LYMPHOCYTES # BLD AUTO: 2.34 THOUSANDS/ΜL (ref 0.6–4.47)
LYMPHOCYTES NFR BLD AUTO: 34 % (ref 14–44)
MCH RBC QN AUTO: 30.2 PG (ref 26.8–34.3)
MCHC RBC AUTO-ENTMCNC: 32.5 G/DL (ref 31.4–37.4)
MCV RBC AUTO: 93 FL (ref 82–98)
MONOCYTES # BLD AUTO: 0.6 THOUSAND/ΜL (ref 0.17–1.22)
MONOCYTES NFR BLD AUTO: 9 % (ref 4–12)
NEUTROPHILS # BLD AUTO: 3.66 THOUSANDS/ΜL (ref 1.85–7.62)
NEUTS SEG NFR BLD AUTO: 53 % (ref 43–75)
NRBC BLD AUTO-RTO: 0 /100 WBCS
PLATELET # BLD AUTO: 249 THOUSANDS/UL (ref 149–390)
PMV BLD AUTO: 10.7 FL (ref 8.9–12.7)
POTASSIUM SERPL-SCNC: 4 MMOL/L (ref 3.5–5.3)
PROT SERPL-MCNC: 8.1 G/DL (ref 6.4–8.2)
PROTHROMBIN TIME: 13.1 SECONDS (ref 11.6–14.5)
RBC # BLD AUTO: 4.67 MILLION/UL (ref 3.81–5.12)
SODIUM SERPL-SCNC: 141 MMOL/L (ref 136–145)
WBC # BLD AUTO: 6.87 THOUSAND/UL (ref 4.31–10.16)

## 2021-02-26 PROCEDURE — 84702 CHORIONIC GONADOTROPIN TEST: CPT

## 2021-02-26 PROCEDURE — 36415 COLL VENOUS BLD VENIPUNCTURE: CPT

## 2021-02-26 PROCEDURE — 85730 THROMBOPLASTIN TIME PARTIAL: CPT

## 2021-02-26 PROCEDURE — 85610 PROTHROMBIN TIME: CPT

## 2021-02-26 PROCEDURE — 85025 COMPLETE CBC W/AUTO DIFF WBC: CPT

## 2021-02-26 PROCEDURE — 80053 COMPREHEN METABOLIC PANEL: CPT

## 2021-02-28 ENCOUNTER — TELEPHONE (OUTPATIENT)
Dept: FAMILY MEDICINE CLINIC | Facility: CLINIC | Age: 26
End: 2021-02-28

## 2021-02-28 PROBLEM — N63.24 BREAST LUMP ON LEFT SIDE AT 7 O'CLOCK POSITION: Status: RESOLVED | Noted: 2020-01-27 | Resolved: 2021-02-28

## 2021-02-28 PROBLEM — J30.2 SEASONAL ALLERGIES: Status: ACTIVE | Noted: 2017-07-28

## 2021-03-01 NOTE — TELEPHONE ENCOUNTER
Please let patient know blood work is good and all forms will be faxed today to clear her for upcoming surgery

## 2021-08-12 NOTE — PROGRESS NOTES
Patient's Lab: [facility]    Last Yearly: N/A  Last Pap: N/A Today  BC: None  Are you taking consistently:   Are you looking to consult about BC:  LMP: 7/3/21  Menses flow: Irregular/lasting 7d/ heavy to moderate flow/pads and tampon usage/mild cramping  S/P HPV Series: No  S/P Covid Shot: Yes  Sexually Active?: No, virgin      Q's/Concerns: Her menses

## 2021-08-17 ENCOUNTER — OFFICE VISIT (OUTPATIENT)
Dept: OBGYN CLINIC | Facility: CLINIC | Age: 26
End: 2021-08-17
Payer: COMMERCIAL

## 2021-08-17 VITALS
HEIGHT: 63 IN | SYSTOLIC BLOOD PRESSURE: 102 MMHG | DIASTOLIC BLOOD PRESSURE: 62 MMHG | BODY MASS INDEX: 18.75 KG/M2 | WEIGHT: 105.8 LBS

## 2021-08-17 DIAGNOSIS — Z30.011 ENCOUNTER FOR PRESCRIPTION OF ORAL CONTRACEPTIVES: ICD-10-CM

## 2021-08-17 DIAGNOSIS — Z01.419 ENCOUNTER FOR GYNECOLOGICAL EXAMINATION (GENERAL) (ROUTINE) WITHOUT ABNORMAL FINDINGS: Primary | ICD-10-CM

## 2021-08-17 PROCEDURE — G0145 SCR C/V CYTO,THINLAYER,RESCR: HCPCS | Performed by: NURSE PRACTITIONER

## 2021-08-17 PROCEDURE — 99385 PREV VISIT NEW AGE 18-39: CPT | Performed by: NURSE PRACTITIONER

## 2021-08-17 RX ORDER — LEVONORGESTREL AND ETHINYL ESTRADIOL 0.1-0.02MG
1 KIT ORAL DAILY
Qty: 28 TABLET | Refills: 2 | Status: SHIPPED | OUTPATIENT
Start: 2021-08-17

## 2021-08-17 NOTE — PATIENT INSTRUCTIONS
Pap every 3 years if normal-done, STI testing as indicated-N/A  Exercise most days of week-minimum of 150 minutes per week  Obtain appropriate diet and hydration  Calcium 1000mg + 600 vit D daily  Start birth control on day one of next menses  If sexually active, use a  back up method of contraception x 7 days  Rx sent to pharmacy on file  Benefits, risks and alternatives of birth control discussed  ACHES reviewed  HPV 9 vaccine recommended through age 39  Check with your insurance for coverage  If covered, call office to schedule start of vaccine series  Annual mammogram starting at age 36, monthly breast self exam    Return to office in 3 months for follow up

## 2021-08-17 NOTE — PROGRESS NOTES
Assessment/Plan:    Pap every 3 years if normal-done, STI testing as indicated-N/A  Exercise most days of week-minimum of 150 minutes per week  Obtain appropriate diet and hydration  Calcium 1000mg + 600 vit D daily  Start birth control on day one of next menses  If sexually active, use a  back up method of contraception x 7 days  Rx sent to pharmacy on file  Benefits, risks and alternatives of birth control discussed  ACHES reviewed  HPV 9 vaccine recommended through age 39  Check with your insurance for coverage  If covered, call office to schedule start of vaccine series  Annual mammogram starting at age 36, monthly breast self exam    Return to office in 3 months for follow up  Diagnoses and all orders for this visit:    Encounter for gynecological examination (general) (routine) without abnormal findings  -     Liquid-based pap, screening    Encounter for prescription of oral contraceptives  -     levonorgestrel-ethinyl estradiol (AVIANE,ALESSE,LESSINA) 0 1-20 MG-MCG per tablet; Take 1 tablet by mouth daily    Other orders  -     Cancel: Chlamydia/GC amplified DNA by PCR          Subjective:      Patient ID: Nathaniel Woodard is a 22 y o  female  Nathaniel Woodard is a 22 y o  female who is here today as a new patient for her annual visit  No health concerns  Today will be her first pap  Monthly menses x 7 days with heavy flow x 2-3 days then moderate flow  Menses is typically acceptable but unpredictable  Would like to discuss contraceptive options for menses regulation  Exercises 1-2 times per weeks  Nathaniel Woodard has never been  sexually active  Completed covid vaccine series but not HPV vaccine series  The following portions of the patient's history were reviewed and updated as appropriate: allergies, current medications, past family history, past medical history, past social history, past surgical history and problem list     Review of Systems   Constitutional: Negative    Negative for activity change, appetite change, chills, diaphoresis, fatigue, fever and unexpected weight change  HENT: Negative for congestion, dental problem, sneezing, sore throat and trouble swallowing  Eyes: Negative for visual disturbance  Respiratory: Negative for chest tightness and shortness of breath  Cardiovascular: Negative for chest pain and leg swelling  Gastrointestinal: Negative for abdominal pain, constipation, diarrhea, nausea and vomiting  Genitourinary: Negative for difficulty urinating, dysuria, frequency, hematuria, menstrual problem, pelvic pain, urgency, vaginal bleeding, vaginal discharge and vaginal pain  Musculoskeletal: Negative for back pain and neck pain  Skin: Negative  Allergic/Immunologic: Negative  Neurological: Negative for weakness and headaches  Hematological: Negative for adenopathy  Psychiatric/Behavioral: Negative  Objective:      /62 (BP Location: Left arm, Patient Position: Sitting, Cuff Size: Standard)   Ht 5' 3" (1 6 m)   Wt 48 kg (105 lb 12 8 oz)   LMP 07/03/2021 (Exact Date)   BMI 18 74 kg/m²          Physical Exam  Vitals and nursing note reviewed  Constitutional:       Appearance: Normal appearance  She is well-developed  HENT:      Head: Normocephalic and atraumatic  Eyes:      General:         Right eye: No discharge  Left eye: No discharge  Neck:      Thyroid: No thyromegaly  Trachea: Trachea normal    Cardiovascular:      Rate and Rhythm: Normal rate and regular rhythm  Heart sounds: Normal heart sounds  Pulmonary:      Effort: Pulmonary effort is normal       Breath sounds: Normal breath sounds  Chest:      Breasts: Breasts are symmetrical          Right: Normal  No inverted nipple, mass, nipple discharge, skin change or tenderness  Left: Normal  No inverted nipple, mass, nipple discharge, skin change or tenderness  Abdominal:      General: Abdomen is flat  Palpations: Abdomen is soft  Genitourinary:     General: Normal vulva  Exam position: Lithotomy position  Labia:         Right: No rash, tenderness, lesion or injury  Left: No rash, tenderness, lesion or injury  Urethra: No prolapse, urethral pain, urethral swelling or urethral lesion  Vagina: Normal  No signs of injury and foreign body  No vaginal discharge, erythema, tenderness or bleeding  Cervix: Normal       Uterus: Normal        Adnexa: Right adnexa normal and left adnexa normal         Right: No mass, tenderness or fullness  Left: No mass, tenderness or fullness  Rectum: No external hemorrhoid  Musculoskeletal:         General: Normal range of motion  Cervical back: Normal range of motion and neck supple  Lymphadenopathy:      Head:      Right side of head: No submental, submandibular or tonsillar adenopathy  Left side of head: No submental, submandibular or tonsillar adenopathy  Cervical: No cervical adenopathy  Upper Body:      Right upper body: No supraclavicular or axillary adenopathy  Left upper body: No supraclavicular or axillary adenopathy  Lower Body: No right inguinal adenopathy  No left inguinal adenopathy  Skin:     General: Skin is warm and dry  Neurological:      Mental Status: She is alert and oriented to person, place, and time     Psychiatric:         Mood and Affect: Mood normal          Behavior: Behavior normal

## 2021-08-19 ENCOUNTER — TELEPHONE (OUTPATIENT)
Dept: OBGYN CLINIC | Facility: CLINIC | Age: 26
End: 2021-08-19

## 2021-08-19 NOTE — TELEPHONE ENCOUNTER
Pt called in to speak to [address] [facility] for a script she said she can do for her, I said she can't speak to her directly as we would sent over a note to triage that can help  She needs nasal spray and stated that AM can give her a script  Not arguing with pt that she would have to go for her doctor for that  Please give her a call  Carolina Beasley

## 2021-08-24 ENCOUNTER — TELEPHONE (OUTPATIENT)
Dept: OBGYN CLINIC | Facility: CLINIC | Age: 26
End: 2021-08-24

## 2021-08-24 LAB
LAB AP GYN PRIMARY INTERPRETATION: NORMAL
LAB AP LMP: NORMAL
Lab: NORMAL

## 2023-04-04 ENCOUNTER — APPOINTMENT (OUTPATIENT)
Dept: LAB | Facility: MEDICAL CENTER | Age: 28
End: 2023-04-04

## 2024-03-15 ENCOUNTER — TELEPHONE (OUTPATIENT)
Dept: FAMILY MEDICINE CLINIC | Facility: CLINIC | Age: 29
End: 2024-03-15

## 2024-03-15 NOTE — LETTER
Ana Cobb  [address]  [place] [address]      3/15/2024      Dear Ana,      Records from Insurance indicate that you are listed as a patient of PHILL Jacob with [facility], [facility].     However, it has now been over 3 years since your last appointment on 2/24/2021.      Please contact our office at [phone] to ensure that you remain established with PHILL Jacob by scheduling your Annual Visit.    If you have established with a primary care physician other than the one listed above, please let our office know so that we can update our records. We also suggest calling the number on the back of your insurance card to update this information with your insurance company.    We thank you for choosing [facility] for your healthcare needs.      Sincerely,    [facility]

## 2024-04-25 NOTE — TELEPHONE ENCOUNTER
04/25/24 10:31 AM    The office's request has been received, reviewed, and noted that it no longer requires attention; duplicate request. This message will now be completed.    Rosalia Sharma

## 2024-04-30 ENCOUNTER — APPOINTMENT (OUTPATIENT)
Dept: LAB | Facility: CLINIC | Age: 29
End: 2024-04-30
Payer: COMMERCIAL

## 2024-04-30 ENCOUNTER — OFFICE VISIT (OUTPATIENT)
Dept: FAMILY MEDICINE CLINIC | Facility: CLINIC | Age: 29
End: 2024-04-30
Payer: COMMERCIAL

## 2024-04-30 VITALS
WEIGHT: 107 LBS | OXYGEN SATURATION: 98 % | TEMPERATURE: 97.8 F | DIASTOLIC BLOOD PRESSURE: 70 MMHG | HEIGHT: 64 IN | RESPIRATION RATE: 16 BRPM | HEART RATE: 55 BPM | SYSTOLIC BLOOD PRESSURE: 120 MMHG | BODY MASS INDEX: 18.27 KG/M2

## 2024-04-30 DIAGNOSIS — Z01.83 ENCOUNTER FOR BLOOD TYPING: ICD-10-CM

## 2024-04-30 DIAGNOSIS — Z00.00 PHYSICAL EXAM, ANNUAL: Primary | ICD-10-CM

## 2024-04-30 DIAGNOSIS — Z00.00 PHYSICAL EXAM, ANNUAL: ICD-10-CM

## 2024-04-30 DIAGNOSIS — Z13.220 LIPID SCREENING: ICD-10-CM

## 2024-04-30 LAB
ABO GROUP BLD: NORMAL
CHOLEST SERPL-MCNC: 157 MG/DL
ERYTHROCYTE [DISTWIDTH] IN BLOOD BY AUTOMATED COUNT: 11.9 % (ref 11.6–15.1)
HCT VFR BLD AUTO: 39.4 % (ref 34.8–46.1)
HDLC SERPL-MCNC: 63 MG/DL
HGB BLD-MCNC: 12.9 G/DL (ref 11.5–15.4)
LDLC SERPL CALC-MCNC: 83 MG/DL (ref 0–100)
MCH RBC QN AUTO: 30.6 PG (ref 26.8–34.3)
MCHC RBC AUTO-ENTMCNC: 32.7 G/DL (ref 31.4–37.4)
MCV RBC AUTO: 93 FL (ref 82–98)
NONHDLC SERPL-MCNC: 94 MG/DL
PLATELET # BLD AUTO: 203 THOUSANDS/UL (ref 149–390)
PMV BLD AUTO: 10.5 FL (ref 8.9–12.7)
RBC # BLD AUTO: 4.22 MILLION/UL (ref 3.81–5.12)
RH BLD: POSITIVE
TRIGL SERPL-MCNC: 54 MG/DL
TSH SERPL DL<=0.05 MIU/L-ACNC: 2.08 UIU/ML (ref 0.45–4.5)
WBC # BLD AUTO: 5.98 THOUSAND/UL (ref 4.31–10.16)

## 2024-04-30 PROCEDURE — 86901 BLOOD TYPING SEROLOGIC RH(D): CPT

## 2024-04-30 PROCEDURE — 84443 ASSAY THYROID STIM HORMONE: CPT

## 2024-04-30 PROCEDURE — 99395 PREV VISIT EST AGE 18-39: CPT | Performed by: NURSE PRACTITIONER

## 2024-04-30 PROCEDURE — 80053 COMPREHEN METABOLIC PANEL: CPT

## 2024-04-30 PROCEDURE — 85027 COMPLETE CBC AUTOMATED: CPT

## 2024-04-30 PROCEDURE — 86900 BLOOD TYPING SEROLOGIC ABO: CPT

## 2024-04-30 PROCEDURE — 80061 LIPID PANEL: CPT

## 2024-04-30 PROCEDURE — 36415 COLL VENOUS BLD VENIPUNCTURE: CPT

## 2024-04-30 NOTE — PROGRESS NOTES
FAMILY [facility] HEALTH MAINTENANCE OFFICE VISIT  [facility] - [facility], [place]    NAME: Ana Cobb  AGE: 28 y.o. SEX: female  : 1995     DATE: 2024    Assessment and Plan     1. Physical exam, annual  -     CBC; Future  -     Comprehensive metabolic panel; Future  -     TSH, 3rd generation; Future    2. Lipid screening  -     Lipid panel; Future    3. Encounter for blood typing  Patient request. She will look into out of pocket cost.   -     ABO/Rh; Future        Patient Counseling:   Nutrition: Stressed importance of a well balanced diet, moderation of sodium/saturated fat, caloric balance and sufficient intake of fiber  Exercise: Stressed the importance of regular exercise with a goal of 150 minutes per week  Dental Health: Discussed daily flossing and brushing and regular dental visits     Immunizations reviewed: Declined recommended vaccinations--Tdap. Reviewed importance of Tdap vaccination. Advised if getting any cuts, lacerations, broken skin with dirty, soiled item, need a tdap vaccine within 1-2 days.   Discussed benefits of:  Screening labs. Pap is up to date, due later this year for repeat.       Follow up in one year or sooner if needed.         Chief Complaint     Chief Complaint   Patient presents with    Well Check       History of Present Illness     Ana Cobb is a 28 year old female presenting today for annual exam.     She feels well and has no complaints today.    Declines Tdap.         Well Adult Physical   Patient here for a comprehensive physical exam.      Diet and Physical Activity  Diet: well balanced diet  Exercise: frequently goes to the gym 4 days per week, will play tennis now that weather is nice.      Depression Screen  PHQ-2/9 Depression Screening    Little interest or pleasure in doing things: 0 - not at all  Feeling down, depressed, or hopeless: 0 - not at all  PHQ-2 Score: 0  PHQ-2 Interpretation: Negative depression screen           General Health  Hearing: Normal:  bilateral  Vision: no vision problems  Dental: regular dental visits    Reproductive Health  Due for gyn care last in 2021.   Will go back to [facility] already established.        The following portions of the patient's history were reviewed and updated as appropriate: allergies, current medications, past family history, past medical history, past social history, past surgical history and problem list.    Review of Systems     Review of Systems   Constitutional: Negative.    HENT: Negative.     Respiratory: Negative.     Cardiovascular: Negative.    Gastrointestinal: Negative.    Genitourinary: Negative.    Musculoskeletal: Negative.    Skin: Negative.    Neurological: Negative.    Hematological: Negative.    Psychiatric/Behavioral: Negative.         Past Medical History     Past Medical History:   Diagnosis Date    Breast lump on left side at 7 o'clock position 01/27/2020    Ear problems     Migraine        Past Surgical History     Past Surgical History:   Procedure Laterality Date    RHINOPLASTY      SINUS SURGERY         Social History     Social History     Socioeconomic History    Marital status: Single     Spouse name: None    Number of children: None    Years of education: None    Highest education level: None   Occupational History    None   Tobacco Use    Smoking status: Never    Smokeless tobacco: Never   Vaping Use    Vaping status: Never Used   Substance and Sexual Activity    Alcohol use: Yes     Comment: social    Drug use: Never    Sexual activity: Never     Birth control/protection: None   Other Topics Concern    None   Social History Narrative    None     Social Determinants of Health     Financial Resource Strain: Not on file   Food Insecurity: Not on file   Transportation Needs: Not on file   Physical Activity: Not on file   Stress: Not on file   Social Connections: Not on file   Intimate Partner Violence: Not on file   Housing Stability: Not on file  "      Family History     Family History   Problem Relation Age of Onset    Migraines Mother     No Known Problems Father     Breast cancer Neg Hx     Ovarian cancer Neg Hx        Current Medications     No current outpatient medications on file.     Allergies     Allergies   Allergen Reactions    Seasonal Ic [Cholestatin] Nasal Congestion       Objective     /70   Pulse 55   Temp 97.8 °F (36.6 °C) (Temporal)   Resp 16   Ht 5' 3.5\" (1.613 m)   Wt 48.5 kg (107 lb)   LMP 04/29/2024 (Exact Date)   SpO2 98%   BMI 18.66 kg/m²      Physical Exam  Vitals and nursing note reviewed.   Constitutional:       General: She is not in acute distress.     Appearance: Normal appearance. She is not ill-appearing.   HENT:      Head: Atraumatic.      Right Ear: Tympanic membrane normal.      Left Ear: Tympanic membrane normal.      Mouth/Throat:      Mouth: Mucous membranes are moist.      Pharynx: Oropharynx is clear.   Eyes:      Conjunctiva/sclera: Conjunctivae normal.      Pupils: Pupils are equal, round, and reactive to light.   Cardiovascular:      Rate and Rhythm: Normal rate and regular rhythm.      Heart sounds: No murmur heard.  Pulmonary:      Effort: Pulmonary effort is normal. No respiratory distress.      Breath sounds: Normal breath sounds. No wheezing or rales.   Musculoskeletal:      Cervical back: Normal range of motion and neck supple.      Right lower leg: No edema.      Left lower leg: No edema.   Lymphadenopathy:      Cervical: No cervical adenopathy.   Skin:     General: Skin is warm and dry.      Findings: No rash.   Neurological:      Mental Status: She is alert.      Gait: Gait normal.   Psychiatric:         Mood and Affect: Mood normal.           PHILL Jacob  [facility]  "

## 2024-05-08 LAB
ALBUMIN SERPL BCP-MCNC: 4.3 G/DL (ref 3.5–5)
ALP SERPL-CCNC: 54 U/L (ref 34–104)
ALT SERPL W P-5'-P-CCNC: 9 U/L (ref 7–52)
ANION GAP SERPL CALCULATED.3IONS-SCNC: 7 MMOL/L (ref 4–13)
AST SERPL W P-5'-P-CCNC: 16 U/L (ref 13–39)
BILIRUB SERPL-MCNC: 0.4 MG/DL (ref 0.2–1)
BUN SERPL-MCNC: 15 MG/DL (ref 5–25)
CALCIUM SERPL-MCNC: 9.1 MG/DL (ref 8.4–10.2)
CHLORIDE SERPL-SCNC: 105 MMOL/L (ref 96–108)
CO2 SERPL-SCNC: 25 MMOL/L (ref 21–32)
CREAT SERPL-MCNC: 0.79 MG/DL (ref 0.6–1.3)
GFR SERPL CREATININE-BSD FRML MDRD: 102 ML/MIN/1.73SQ M
GLUCOSE P FAST SERPL-MCNC: 76 MG/DL (ref 65–99)
POTASSIUM SERPL-SCNC: 3.8 MMOL/L (ref 3.5–5.3)
PROT SERPL-MCNC: 6.9 G/DL (ref 6.4–8.4)
SODIUM SERPL-SCNC: 137 MMOL/L (ref 135–147)